# Patient Record
Sex: MALE | Employment: OTHER | URBAN - METROPOLITAN AREA
[De-identification: names, ages, dates, MRNs, and addresses within clinical notes are randomized per-mention and may not be internally consistent; named-entity substitution may affect disease eponyms.]

---

## 2024-06-03 ENCOUNTER — OFFICE VISIT (OUTPATIENT)
Dept: URGENT CARE | Facility: CLINIC | Age: 64
End: 2024-06-03
Payer: COMMERCIAL

## 2024-06-03 VITALS
DIASTOLIC BLOOD PRESSURE: 84 MMHG | RESPIRATION RATE: 18 BRPM | HEART RATE: 72 BPM | SYSTOLIC BLOOD PRESSURE: 142 MMHG | TEMPERATURE: 97.7 F | OXYGEN SATURATION: 95 %

## 2024-06-03 DIAGNOSIS — J06.9 UPPER RESPIRATORY TRACT INFECTION, UNSPECIFIED TYPE: Primary | ICD-10-CM

## 2024-06-03 PROBLEM — E11.9 DIABETES MELLITUS (HCC): Chronic | Status: ACTIVE | Noted: 2017-03-27

## 2024-06-03 PROBLEM — H91.02: Status: ACTIVE | Noted: 2020-10-12

## 2024-06-03 PROCEDURE — G0381 LEV 2 HOSP TYPE B ED VISIT: HCPCS

## 2024-06-03 PROCEDURE — S9083 URGENT CARE CENTER GLOBAL: HCPCS

## 2024-06-03 NOTE — PATIENT INSTRUCTIONS
Symptoms of a viral infection may linger for up to 10 days. Your contagious period is the first 5-7 days of symptom onset. Continue over-the-counter products for symptoms: tylenol for fevers, ibuprofen for body aches, flonase (fluticasone) with nasal saline for congestion, robitussin/coricidin for cough, and airborne/emergen-c for vitamin supplementation. Follow-up with PCP in 3-5 days. Report to ER if symptoms worsen.

## 2024-06-03 NOTE — PROGRESS NOTES
[facility]        NAME: William George is a 63 y.o. male  : 1960    MRN: [id number]  DATE: Fanta 3, 2024  TIME: 10:09 AM    Assessment and Plan   Upper respiratory tract infection, unspecified type [J06.9]  1. Upper respiratory tract infection, unspecified type          Patient declined COVID/flu testing. Suspect viral illness given clinical presentation. VSS in clinic, appears in no acute distress. Educated on use of OTC products for symptoms. Advised close follow-up with PCP or to report to the ER if symptoms worsen. Patient verbalizes understanding and agreeable to plan.       Patient Instructions     Symptoms of a viral infection may linger for up to 10 days. Your contagious period is the first 5-7 days of symptom onset. Continue over-the-counter products for symptoms: tylenol for fevers, ibuprofen for body aches, flonase (fluticasone) with nasal saline for congestion, robitussin/coricidin for cough, and airborne/emergen-c for vitamin supplementation. Follow-up with PCP in 3-5 days. Report to ER if symptoms worsen.       Chief Complaint     Chief Complaint   Patient presents with    Facial Pain     Sinus and face pain, PND, Cough, and Sinus h/a.   Taking zyrtec, mucines, tylenol and cough drops w/o relief.          History of Present Illness       63 year old male presents for evaluation of headache, congestion, and sinus pain/pressure for the past 2 days. He reports he is currently traveling from [place]. He denies any known direct sick contacts but relates his brother has an ongoing cough. He is UTD on COVID but not flu vaccines for this season. He denies any known fevers, SOB, or productive sputum. He has tried Advil for the headaches, mucinex, and zyrtec with some improvement.     URI   This is a new problem. The current episode started in the past 7 days. The problem has been unchanged. There has been no fever. Associated symptoms include congestion, headaches, joint pain, rhinorrhea  and sinus pain. Pertinent negatives include no abdominal pain, chest pain, coughing, diarrhea, dysuria, ear pain, joint swelling, nausea, neck pain, plugged ear sensation, rash, sneezing, sore throat, swollen glands, vomiting or wheezing. He has tried decongestant and NSAIDs for the symptoms. The treatment provided mild relief.       Review of Systems   Review of Systems   Constitutional:  Negative for activity change, appetite change, chills, fatigue and fever.   HENT:  Positive for congestion, postnasal drip, rhinorrhea, sinus pressure and sinus pain. Negative for ear pain, sneezing, sore throat and trouble swallowing.    Eyes:  Negative for visual disturbance.   Respiratory:  Negative for cough, chest tightness, shortness of breath and wheezing.    Cardiovascular:  Negative for chest pain and palpitations.   Gastrointestinal:  Negative for abdominal pain, constipation, diarrhea, nausea and vomiting.   Genitourinary:  Negative for dysuria.   Musculoskeletal:  Positive for joint pain. Negative for arthralgias, back pain, myalgias and neck pain.   Skin:  Negative for color change, pallor and rash.   Allergic/Immunologic: Negative for environmental allergies and food allergies.   Neurological:  Positive for headaches. Negative for dizziness and light-headedness.         Current Medications     No current outpatient medications on file.    Current Allergies     Allergies as of 06/03/2024 - Reviewed 06/03/2024   Allergen Reaction Noted    Penicillins Anaphylaxis 01/09/2015            The following portions of the patient's history were reviewed and updated as appropriate: allergies, current medications, past family history, past medical history, past social history, past surgical history and problem list.     Past Medical History:   Diagnosis Date    Hypertension        No past surgical history on file.    No family history on file.      Medications have been verified.        Objective   /84   Pulse 72   Temp  97.7 °F (36.5 °C)   Resp 18   SpO2 95%        Physical Exam     Physical Exam  Vitals and nursing note reviewed.   Constitutional:       General: He is awake. He is not in acute distress.     Appearance: Normal appearance. He is overweight.   HENT:      Head: Normocephalic and atraumatic.      Right Ear: Hearing, ear canal and external ear normal. A middle ear effusion is present.      Left Ear: Hearing, ear canal and external ear normal. A middle ear effusion is present.      Nose: Congestion and rhinorrhea present. Rhinorrhea is clear.      Right Turbinates: Enlarged. Not swollen or pale.      Left Turbinates: Enlarged. Not swollen or pale.      Right Sinus: Maxillary sinus tenderness present. No frontal sinus tenderness.      Left Sinus: Maxillary sinus tenderness present. No frontal sinus tenderness.      Mouth/Throat:      Lips: Pink.      Mouth: Mucous membranes are moist.      Pharynx: Oropharynx is clear. Uvula midline. Postnasal drip present. No pharyngeal swelling, oropharyngeal exudate, posterior oropharyngeal erythema or uvula swelling.   Eyes:      Conjunctiva/sclera: Conjunctivae normal.   Cardiovascular:      Rate and Rhythm: Normal rate and regular rhythm.      Pulses: Normal pulses.      Heart sounds: Normal heart sounds.   Pulmonary:      Effort: Pulmonary effort is normal.      Breath sounds: Normal breath sounds.   Musculoskeletal:      Cervical back: Full passive range of motion without pain, normal range of motion and neck supple.   Lymphadenopathy:      Cervical: No cervical adenopathy.   Skin:     General: Skin is warm and dry.   Neurological:      General: No focal deficit present.      Mental Status: He is alert and oriented to person, place, and time.   Psychiatric:         Mood and Affect: Mood normal.         Behavior: Behavior normal. Behavior is cooperative.         Thought Content: Thought content normal.         Judgment: Judgment normal.